# Patient Record
Sex: FEMALE | Race: WHITE | NOT HISPANIC OR LATINO | URBAN - METROPOLITAN AREA
[De-identification: names, ages, dates, MRNs, and addresses within clinical notes are randomized per-mention and may not be internally consistent; named-entity substitution may affect disease eponyms.]

---

## 2021-05-04 ENCOUNTER — COMMUNICATION - HEALTHEAST (OUTPATIENT)
Dept: TELEHEALTH | Facility: CLINIC | Age: 26
End: 2021-05-04

## 2021-05-04 ENCOUNTER — HOSPITAL ENCOUNTER (OUTPATIENT)
Dept: PHYSICAL MEDICINE AND REHAB | Facility: CLINIC | Age: 26
Discharge: HOME | End: 2021-05-04
Attending: PAIN MEDICINE

## 2021-05-04 DIAGNOSIS — M54.2 CERVICALGIA: ICD-10-CM

## 2021-05-04 DIAGNOSIS — G89.29 CHRONIC BILATERAL LOW BACK PAIN WITHOUT SCIATICA: ICD-10-CM

## 2021-05-04 DIAGNOSIS — M54.50 CHRONIC BILATERAL LOW BACK PAIN WITHOUT SCIATICA: ICD-10-CM

## 2021-05-04 DIAGNOSIS — M79.601 PAIN OF RIGHT UPPER EXTREMITY: ICD-10-CM

## 2021-05-04 DIAGNOSIS — M79.18 MYOFASCIAL PAIN: ICD-10-CM

## 2021-05-04 RX ORDER — GABAPENTIN 300 MG/1
300 CAPSULE ORAL 3 TIMES DAILY
Qty: 270 CAPSULE | Refills: 1 | Status: SHIPPED | OUTPATIENT
Start: 2021-05-04

## 2021-05-04 ASSESSMENT — MIFFLIN-ST. JEOR: SCORE: 1352.78

## 2021-05-12 ENCOUNTER — HOSPITAL ENCOUNTER (OUTPATIENT)
Dept: MRI IMAGING | Facility: CLINIC | Age: 26
Discharge: HOME | End: 2021-05-12
Attending: PAIN MEDICINE

## 2021-05-12 DIAGNOSIS — M79.601 PAIN OF RIGHT UPPER EXTREMITY: ICD-10-CM

## 2021-05-12 DIAGNOSIS — M54.2 CERVICALGIA: ICD-10-CM

## 2021-05-14 ENCOUNTER — HOSPITAL ENCOUNTER (OUTPATIENT)
Dept: PHYSICAL MEDICINE AND REHAB | Facility: CLINIC | Age: 26
Discharge: HOME | End: 2021-05-14
Attending: PAIN MEDICINE

## 2021-05-14 DIAGNOSIS — G93.5 CHIARI MALFORMATION TYPE I (H): ICD-10-CM

## 2021-05-14 DIAGNOSIS — M50.20 CERVICAL DISC HERNIATION: ICD-10-CM

## 2021-05-14 DIAGNOSIS — M79.18 MYOFASCIAL PAIN: ICD-10-CM

## 2021-05-14 DIAGNOSIS — M54.50 CHRONIC BILATERAL LOW BACK PAIN WITHOUT SCIATICA: ICD-10-CM

## 2021-05-14 DIAGNOSIS — G89.29 CHRONIC BILATERAL LOW BACK PAIN WITHOUT SCIATICA: ICD-10-CM

## 2021-05-14 DIAGNOSIS — M54.12 CERVICAL RADICULITIS: ICD-10-CM

## 2021-05-14 ASSESSMENT — MIFFLIN-ST. JEOR: SCORE: 1352.78

## 2021-05-18 ENCOUNTER — OFFICE VISIT - HEALTHEAST (OUTPATIENT)
Dept: PHYSICAL THERAPY | Facility: REHABILITATION | Age: 26
End: 2021-05-18

## 2021-05-18 DIAGNOSIS — M54.50 LUMBAR SPINE PAINFUL ON MOVEMENT: ICD-10-CM

## 2021-05-18 DIAGNOSIS — M54.2 CERVICALGIA: ICD-10-CM

## 2021-05-18 DIAGNOSIS — M54.12 CERVICAL RADICULOPATHY: ICD-10-CM

## 2021-05-18 DIAGNOSIS — R19.8 ABDOMINAL WEAKNESS: ICD-10-CM

## 2021-05-21 ENCOUNTER — HOSPITAL ENCOUNTER (OUTPATIENT)
Dept: MRI IMAGING | Facility: CLINIC | Age: 26
Discharge: HOME | End: 2021-05-21
Attending: PAIN MEDICINE

## 2021-05-21 DIAGNOSIS — G93.5 CHIARI MALFORMATION TYPE I (H): ICD-10-CM

## 2021-05-24 ENCOUNTER — COMMUNICATION - HEALTHEAST (OUTPATIENT)
Dept: PHYSICAL MEDICINE AND REHAB | Facility: CLINIC | Age: 26
End: 2021-05-24

## 2021-06-01 ENCOUNTER — OFFICE VISIT - HEALTHEAST (OUTPATIENT)
Dept: PHYSICAL THERAPY | Facility: REHABILITATION | Age: 26
End: 2021-06-01

## 2021-06-01 DIAGNOSIS — M54.50 LUMBAR SPINE PAINFUL ON MOVEMENT: ICD-10-CM

## 2021-06-01 DIAGNOSIS — R19.8 ABDOMINAL WEAKNESS: ICD-10-CM

## 2021-06-01 DIAGNOSIS — M54.2 CERVICALGIA: ICD-10-CM

## 2021-06-01 DIAGNOSIS — M54.12 CERVICAL RADICULOPATHY: ICD-10-CM

## 2021-06-15 ENCOUNTER — HOSPITAL ENCOUNTER (OUTPATIENT)
Dept: PHYSICAL MEDICINE AND REHAB | Facility: CLINIC | Age: 26
Discharge: HOME | End: 2021-06-15
Attending: PAIN MEDICINE

## 2021-06-15 DIAGNOSIS — M50.20 CERVICAL DISC HERNIATION: ICD-10-CM

## 2021-06-15 DIAGNOSIS — G93.5 CHIARI MALFORMATION TYPE I (H): ICD-10-CM

## 2021-06-15 DIAGNOSIS — G89.29 CHRONIC BILATERAL LOW BACK PAIN WITHOUT SCIATICA: ICD-10-CM

## 2021-06-15 DIAGNOSIS — M54.12 CERVICAL RADICULITIS: ICD-10-CM

## 2021-06-15 DIAGNOSIS — M54.50 CHRONIC BILATERAL LOW BACK PAIN WITHOUT SCIATICA: ICD-10-CM

## 2021-06-15 DIAGNOSIS — M79.18 MYOFASCIAL PAIN: ICD-10-CM

## 2021-06-15 RX ORDER — NORGESTIMATE AND ETHINYL ESTRADIOL 0.25-0.035
KIT ORAL
Status: SHIPPED | COMMUNITY
Start: 2021-05-04

## 2021-06-15 ASSESSMENT — MIFFLIN-ST. JEOR: SCORE: 1339.17

## 2021-06-16 ENCOUNTER — OFFICE VISIT - HEALTHEAST (OUTPATIENT)
Dept: PHYSICAL THERAPY | Facility: REHABILITATION | Age: 26
End: 2021-06-16

## 2021-06-16 DIAGNOSIS — R19.8 ABDOMINAL WEAKNESS: ICD-10-CM

## 2021-06-16 DIAGNOSIS — M54.12 CERVICAL RADICULOPATHY: ICD-10-CM

## 2021-06-16 DIAGNOSIS — M54.2 CERVICALGIA: ICD-10-CM

## 2021-06-16 DIAGNOSIS — M54.50 LUMBAR SPINE PAINFUL ON MOVEMENT: ICD-10-CM

## 2021-07-15 VITALS
HEIGHT: 61 IN | WEIGHT: 150 LBS | HEIGHT: 61 IN | BODY MASS INDEX: 28.32 KG/M2 | BODY MASS INDEX: 28.32 KG/M2 | WEIGHT: 150 LBS

## 2021-07-15 VITALS — WEIGHT: 147 LBS | HEIGHT: 61 IN | BODY MASS INDEX: 27.75 KG/M2

## 2021-07-15 NOTE — PROGRESS NOTES
[facility]   Cervical Thoracic Initial Evaluation    Patient Name: Yakelin Rogers  Date of evaluation: 5/18/2021  Referral Diagnosis: Cervicalgia  Referring provider: Sal Vaughan DO  Visit Diagnosis:     ICD-10-CM    1. Cervicalgia  M54.2    2. Cervical radiculopathy  M54.12    3. Lumbar spine painful on movement  M54.5    4. Abdominal weakness  R19.8        Precautions from MRI: Chiari I malformation with mildly beaked inferior cerebellar tonsils extending 7 mm below the foramen magnum.    Assessment:        Patient is a 26 y.o. female that presents with signs and symptoms consistent with RUE and neck pain secondary to cervical radiculopathy and facet arthropathy - patient also complaining of lower back pains too. Patient demonstrates impairments including decreased cervical flexibility and joint mobility, with myofascial restrictions of UT, levators, pectorals and cervical paraspinals, with - ULNTT but reported ulnar radicular symptoms, leading to impaired functional mobility. Patient's functional limitations include sleeping comfortably at night, standing at work for logner than 15 minutes, and performing daily household chores without difficulty. Today patient responded well to patient education, therapeutic exercise and manual therapy.    Patient educated, demonstrated understanding and is in agreement with nature of impairment, plan of care, patient role and HEP. Patient compliant with PT and prognosis is good. Patient would benefit from skilled PT to progress and improve above impairments.    The POC is dynamic and will be modified on an ongoing basis.  Patient will return to clinic if symptoms persist.  Barriers to achieving goals as noted in the assessment section may affect outcome.  Prognosis to achieve goals is  good   Pt. is appropriate for skilled PT intervention as outlined in the Plan of Care (POC).  Pt. is a good candidate for skilled PT services to improve pain levels  and function.    Goals:  Pt. will be independent with home exercise program in : 6 weeks    Pt will: be able to stand for longer than 15 minutes without increased LBP or difficultyby 6 weeks.  Pt will: be able to sleep comfortably throughout the night without increased pain symptoms by 6 weeks.  Pt will: be able to look down without increased radicular symptoms or difficulty by 6 weeks.      Patient's expectations/goals are realistic.    Barriers to Learning or Achieving Goals:  Non- adherence to the home exercise program.  Chronicity of the problem.       Plan / Patient Instructions:        Plan of Care:   Communication with: Referral Source  Patient Related Instruction: Nature of Condition;Treatment plan and rationale;Self Care instruction;Basis of treatment;Body mechanics;Posture;Next steps;Expected outcome  Times per Week: 1  Number of Weeks: 12  Number of Visits: 12  Discharge Planning: independent HEP and/or plateau of progress  Therapeutic Exercise: ROM;Stretching;Strengthening  Neuromuscular Reeducation: kinesio tape;TNE;posture;core  Manual Therapy: soft tissue mobilization;myofascial release;joint mobilization;muscle energy  Modalities: TENS      POC and pathology of condition were reviewed with patient.  Pt. is in agreement with the Plan of Care  A Home Exercise Program (HEP) was initiated today.  Pt. was instructed in exercises by PT and patient was given a handout with detailed instructions.    Plan for next visit: review HEP, cervical isometrics, scapular strengthening, continue with manual tx, lumbar and gluteal strengthening     Subjective:         History of Present Illness:    Yakelin is a 26 y.o. female who presents to therapy today with complaints of RUE and neck pain. Date of onset is April 2021 and onset was sudden, woke up crying in pain, feeling it into her shoulder blade and down the R arm. She did go to chiropractor and massage and none of that has helped, the nerve meds have been helping,  "less intense. Symptoms are constant and getting better. Patient reports her lower back and hips are \"tilted forward\" so she thinks she might have a pinched nerve there too. She denies history of similar symptoms. She describes their previous level of function as not limited. Patient stands 8-9 hours for working, moving constantly, and drives 1 hour everyday and sometimes it is tough for her to get out of the car. The neck is more stiff in the morning, and back is more stiff at night. Sleeping at night patient usually sleeps on her back but has to turn her hips to the L, 2 pillows. Headaches, she can feel the pressure behind her head and eyes.     Pain Rating:3  Pain rating at best: 2  Pain rating at worst: 9  Pain description: tingling and tightness into her shoulders and neck, \"pressure,\" low back is more the sharp shooting pain    Functional limitations are described as occurring with:   turning head  performing routine daily activities  sitting for longer periods of time  standing for longer periods of time with her low back  walking for longer periods of time with her low back    Patient reports benefit from:  medications         Objective:      Note: Items left blank indicates the item was not performed or not indicated at the time of the evaluation.    Patient Outcome Measures :    Neck Disability Score in %: 28     Scores range from 0-100%, where a score of 0% represents minimal pain and maximal function. The minmal clinically important difference is a score reduction of 10%.    Cervical Thoracic Examination  1. Cervicalgia     2. Cervical radiculopathy     3. Lumbar spine painful on movement     4. Abdominal weakness       Involved side: Right  Posture Observation:      General sitting posture is  normal.  General standing posture is normal.    Cervical ROM:  NT today but no visual limitations    Strength   WFL no increase of symptoms - both UE and BLE    Flexibility/Tissue Extensibility: decreased of R UT, " levators, pectorals, cervical paraspinals  Palpation: TTP at R>L cervical spine  Passive Mobility-Joint Integrity: Hypomobile.      Treatment Today      Patient Instruction:  EDU on seated posture, activity modification and exercise consistency in order to see progress/change    Manual Therapy:  Supine MFR R>L UT, levators, cervical paraspinals    Exercises:  Exercise #1: supine or seated chin tuck - hold 5 sec x 10  Comment #1: UT and levator stretching - hold 20-30 sec  Exercise #2: median nerve slides and rockers - 10-20 reps  Comment #2: ulnar nerve glides - 10-20 reps  Exercise #3: supine hip rolls - 20 reps  Comment #3: STKC, piriformis and QL stretch - hold 20-30 sec  Exercise #4: seated piriformis and hamstring stretch - hold 30 sec        TREATMENT MINUTES COMMENTS   Evaluation 15    Self-care/ Home management     Manual therapy 15 Supine MFR R>L UT, levators, cervical paraspinals   Neuromuscular Re-education     Therapeutic Activity     Therapeutic Exercises 25 See flowsheet   Gait training     Modality__________________                Total 55    Blank areas are intentional and mean the treatment did not include these items.     PT Evaluation Code: (Please list factors)  Patient History/Comorbidities: cervicalgia, low back pain  Examination: decreased mobility increased pain  Clinical Presentation: stable  Clinical Decision Making: low    Patient History/  Comorbidities Examination  (body structures and functions, activity limitations, and/or participation restrictions) Clinical Presentation Clinical Decision Making (Complexity)   No documented Comorbidities or personal factors 1-2 Elements Stable and/or uncomplicated Low   1-2 documented comorbidities or personal factor 3 Elements Evolving clinical presentation with changing characteristics Moderate   3-4 documented comorbidities or personal factors 4 or more Unstable and unpredictable High                Heavenly Gold, PT  5/18/2021  8:03  AM

## 2021-07-15 NOTE — PROGRESS NOTES
Progress Notes by Heavenly Gold PT at 6/16/2021  8:00 AM     Author: Heavenly Gold PT Service: -- Author Type: Physical Therapist    Filed: 8/10/2021  9:03 AM Encounter Date: 6/16/2021 Status: Addendum    : Heavenly Gold PT (Physical Therapist)    Related Notes: Original Note by Heavenly Gold PT (Physical Therapist) filed at 6/16/2021  8:27 AM       [facility] Discharge Summary  Patient Name: Yakelin Rogers  Date: 8/10/2021  Referral Diagnosis: neck  Referring provider: No ref. provider found  Visit Diagnosis:   1. Cervicalgia     2. Cervical radiculopathy     3. Lumbar spine painful on movement     4. Abdominal weakness         Goals:  Pt. will be independent with home exercise program in : 6 weeks    Pt will: be able to stand for longer than 15 minutes without increased LBP or difficultyby 6 weeks.  Pt will: be able to sleep comfortably throughout the night without increased pain symptoms by 6 weeks.  Pt will: be able to look down without increased radicular symptoms or difficulty by 6 weeks.      Patient was seen for 3 visits for physical therapy of neck pain from 5/18/21 to 6/16/21 with no follow up appointments.   The patient met goals and has demonstrated understanding of and independence in the home program for self-care, and progression to next steps.  She will initiate contact if questions or concerns arise.  The patient reports feeling better and did not wish to schedule further therapy at this time.    Therapy will be discontinued at this time.  The patient will need a new referral to resume physical therapy treatment. Please see below for patient's current status.    Thank you for your referral.  Heavenly Gold, PT, DPT  8/10/2021   9:03 AM      [facility] Daily Progress     Patient Name: Yakelin Rogers  Date: 6/16/2021  Visit #: 3/12  Referral Diagnosis: RUE and neck pain  Referring provider: No ref. provider found  Visit Diagnosis:      ICD-10-CM    1. Cervicalgia  M54.2    2. Cervical radiculopathy  M54.12    3. Lumbar spine painful on movement  M54.5    4. Abdominal weakness  R19.8        Assessment:     Today patient hasn't been seen in 2 weeks and reports she is doing better! Low back still bothering her the most. Patient has met most goals and would be appropriate to trial independence if she feels comfortable with that    From Eval:  Patient is a 26 y.o. female that presents with signs and symptoms consistent with RUE and neck pain secondary to cervical radiculopathy and facet arthropathy - patient also complaining of lower back pains too. Patient demonstrates impairments including decreased cervical flexibility and joint mobility, with myofascial restrictions of UT, levators, pectorals and cervical paraspinals, with - ULNTT but reported ulnar radicular symptoms, leading to impaired functional mobility. Patient's functional limitations include sleeping comfortably at night, standing at work for logner than 15 minutes, and performing daily household chores without difficulty.     HEP/POC compliance is  good .  Patient demonstrates understanding/independence with home program.  Patient is appropriate to continue with skilled physical therapy intervention, as indicated by initial plan of care.    Goal Status:  Pt. will be independent with home exercise program in : 6 weeks    Pt will: be able to stand for longer than 15 minutes without increased LBP or difficultyby 6 weeks.  Pt will: be able to sleep comfortably throughout the night without increased pain symptoms by 6 weeks.  Pt will: be able to look down without increased radicular symptoms or difficulty by 6 weeks.        Plan / Patient Education:     Continue with initial plan of care.  Progress with home program as tolerated.    Plan for next visit: review HEP, scapular strengthening with bands, continue with manual tx, lumbar and gluteal strengthening - quadruped, abdominal  "strengthening    Subjective:     Patient feeling a lot better, she feels like she is getting stronger in hips and shoulder areas. Feeling like exercises are getting easier. Looking down is still bothersome for her once in awhile she feels it. Lower back probably would be worse.        Pain description: tingling and tightness into her shoulders and neck, \"pressure,\" low back is more the sharp shooting pain       Objective:     Flexibility/Tissue Extensibility: decreased of R UT, levators, pectorals, cervical paraspinals    Treatment Today      Exercises:  Exercise #1: supine or seated chin tuck - hold 5 sec x 10  Comment #1: UT and levator stretching - hold 20-30 sec  Exercise #2: median nerve slides and rockers - 10-20 reps  Comment #2: ulnar nerve glides - 10-20 reps  Exercise #3: supine hip rolls - 20 reps  Comment #3: STKC, piriformis and QL stretch - hold 20-30 sec  Exercise #4: seated piriformis and hamstring stretch - hold 30 sec  Comment #4: theraband row and low row - 10-20 reps orange band  Exercise #5: cervical isometric flexion and SB - hold 5 sec x 10 - progressed to sidelying head lift  Comment #5: bridge with hip adduction - 10-30 reps, progress to 10 sec hold  Exercise #6: SLR with PPT - 10-30 reps - progress to seated positioning  Comment #6: sidelying hip abduction and clamshells - 10-30 reps, progress to banded or standing        TREATMENT MINUTES COMMENTS   Evaluation     Self-care/ Home management     Manual therapy     Neuromuscular Re-education     Therapeutic Activity     Therapeutic Exercises 25 See above flowsheet  Nustep 4 min   Gait training     Modality__________________                Total 25    Blank areas are intentional and mean the treatment did not include these items.       Heavenly Gold, PT  6/16/2021         "

## 2021-07-15 NOTE — PATIENT INSTRUCTIONS - HE
Prescribed Gabapentin today, 300 mg tablets, to be titrated up to 3 tablets 3 times a day as tolerated for your nerve pain. Please follow Gabapentin dosing chart below.    Gabapentin 300mg Dosing Chart    DATE  MORNING AFTERNOON BEDTIME    Day 1 0 0 1    Day 2 0 0 1    Day 3 0 0 1    Day 4 1 0 1    Day 5 1 0 1    Day 6 1 0 1    Day 7 1 1 1    Day 8 1 1 1    Day 9 1 1 1    Day 10 1 1 2    Day 11 1 1 2    Day 12 1 1 2    Day 13 2 1 2    Day 14 2 1 2    Day 15 2 1 2    Day 16 2 2 2    Day 17 2 2 2    Day 18 2 2 2    Day 19 2 2 3    Day 20 2 2 3    Day 21 2 2 3    Day 22 3 2 3    Day 23 3 2 3    Day 24 3 2 3    Day 25 3 3 3    Day 26 3 3 3    Day 27 3 3 3     Continue medication, taking 3 capsules three times daily    Please call if you have any questions regarding how to take your medication  Clinic Phone # [phone]        Discussed the importance of core strengthening, ROM, stretching exercises with the patient and how each of these entities is important in decreasing pain.  Explained to the patient that the purpose of physical therapy is to teach the patient a home exercise program.  These exercises need to be performed every day in order to decrease pain and prevent future occurrences of pain.        I ordered an MRI of your neck.  Once I review these images I will have one of our nurses give you a call with results and recommendations.    ~Please call Nurse Navigation line [phone] with any questions or concerns about your treatment plan, if symptoms worsen and you would like to be seen urgently, or if you have problems controlling bladder and bowel function.

## 2021-07-15 NOTE — PROGRESS NOTES
Assessment:     Diagnoses and all orders for this visit:    Cervical disc herniation    Cervical radiculitis    Chronic bilateral low back pain without sciatica    Myofascial pain    Chiari malformation type I (H)       Yakelin Rogers is a 26 y.o. y.o. female with past medical history significant for  anxiety, depression, chronic low back pain who presents today for follow-up regarding neck pain and right upper extremity pain:     -Overall patient has seen improvement with neck and arm pain.  Pain is likely secondary to cervical radiculopathy.  Physical exam is reassuring that she has normal strength in her upper extremities.    Plan:     A shared decision making plan was used.  The patient's values and choices were respected. Prior medical records from our last visit on 5/14/2021 as well as physical therapy notes were reviewed today. The following represents what was discussed and decided upon by the provider and the patient.     -DIAGNOSTIC TESTS: Images were personally reviewed and interpreted.   --MRI of the cervical spine dated 5/12/2021 is personally reviewed images interpreted discussed with patient.  There is a Chiari I malformation with cerebellar tonsils extending 7 mm below the foramen magnum.  There is degenerative changes greatest at C6-7 with mild spinal canal stenosis and moderate right and mild left foraminal stenosis.  There is also mild right foraminal stenosis at C3-4 through C5-6.  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  There is mild facet arthropathy at C6-7.    --MRI of brain dated 5/21/2021 report is reviewed.  It does show mild Chiari malformation type I.  There is no acute infarct or acute intracranial hemorrhage or intracranial mass.    -INTERVENTIONS: No interventions at this time.    -MEDICATIONS: She has been having side effects with gabapentin I recommend that she trial decreasing this to 1 300 mg tablet at bedtime for 3 nights and then off this medication.  Should she find pain  returns she can consider going back, safely.  -  Discussed side effects of medications and proper use. Patient verbalized understanding.    -PHYSICAL THERAPY: Patient's had 2 sessions of physical therapy.  Recommend she continue with this.  Discussed the importance of core strengthening, ROM, stretching exercises with the patient and how each of these entities is important in decreasing pain.  Explained to the patient that the purpose of physical therapy is to teach the patient a home exercise program.  These exercises need to be performed every day in order to decrease pain and prevent future occurrences of pain.        -PATIENT EDUCATION: Discussed pain management in a multimodal fashion including physical therapy, medication management, reasoning for decreasing gabapentin and possibly going back on it.    -FOLLOW UP: As needed  Advised to contact clinic if symptoms worsen or change.    Subjective:     Yakelin Rogers is a 26 y.o. female who presents today for follow-up regarding neck and right upper extremity pain.  Patient reports that her pain has been improving.  She has had 2 sessions of physical therapy and finds this to be helpful.  She had gone up on the gabapentin to higher doses but noted that her periods seem to be irregular when she was doing this and went back down to 300 mg once in the morning and once at bedtime.  This has been helpful for pain, however she notes that since being on gabapentin she is not been able to have an orgasm and wonders if it is because of this medication.  Her pain today is 2/10 is worst is 9/10 as best as 2/10.  Pain is mainly in the neck to discuss her low back pain.  She notes some numbness and tingling in her right shoulder and her last 3 digits in her right hand.  She notes that she feels like she has a lazy hands.  She denies any bowel or bladder changes, fevers, chills, unintentional weight loss.    No myelopathic symptoms.     Evaluation to Date: MRI the cervical  "spine dated 5/12/2021.  MRI brain dated 5/21/2021.     Treatment to Date: Massage.  2 sessions of physical therapy.    There is no problem list on file for this patient.      Current Outpatient Medications on File Prior to Encounter   Medication Sig Dispense Refill     gabapentin (NEURONTIN) 300 MG capsule Take 1 capsule (300 mg total) by mouth 3 (three) times a day. Increase to 3 tablets three times a day as instructed. 270 capsule 1     SPRINTEC, 28, 0.25-35 mg-mcg per tablet TAKE 1 TABLET BY MOUTH ONCE DAILY. MAY TAKE CONTINUOUSLY IF DESIRED, NEW PACK EVERY 3 WEEKS       No current facility-administered medications on file prior to encounter.        Allergies   Allergen Reactions     Amoxicillin-Pot Clavulanate Rash     Not hives       No past medical history on file.     Review of Systems  ROS: Specifically negative for bowel/bladder dysfunction, balance changes, headache, dizziness, foot drop, fevers, chills, appetite changes, nausea/vomiting, unexplained weight loss. Otherwise 13 systems reviewed are negative. Please see the patient's intake questionnaire from today for details.    Reviewed Social, Family, Past Medical and Past Surgical history with patient, no significant changes noted since prior visit.     Objective:     /61 (Patient Site: Right Arm, Patient Position: Sitting)   Pulse 73   Ht 5' 1\" (1.549 m)   Wt 147 lb (66.7 kg)   LMP 05/06/2021   BMI 27.78 kg/m      PHYSICAL EXAMINATION:   --CONSTITUTIONAL: Vital signs as above. No acute distress. The patient is well nourished and well groomed.  --PSYCHIATRIC:  The patient is awake, alert, oriented to person, place, time and answering questions appropriately with clear speech. Appropriate mood and affect   --HEENT: Sclera are non-injected.   --SKIN: Skin over the face is clean, dry, intact without rashes.  --RESPIRATORY: Normal rhythm and effort. No abnormal accessory muscle breathing patterns noted.   --GROSS MOTOR: Easily arises from a seated " position.   --CERVICAL SPINE: Inspection reveals no evidence of deformity. Range of motion is mildly limited in cervical flexion, extension, lateral rotation.  Mild tenderness palpation on the right cervical paraspinal muscles  --UPPER EXTREMITY MOTOR TESTING:  Wrist flexion left 5/5, right 5/5  Wrist extension left 5/5, right 5/5  Pronators left 5/5, right 5/5  Biceps left 5/5, right 5/5   Triceps left 5/5, right 5/5   Shoulder abduction left 5/5, right 5/5   left 5/5, right 5/5  --NEUROLOGIC: Decreased sensation in her right hand.    Imaging of the cervical spine: Cervical spine imaging was reviewed today. The images were shown to the patient and the findings were explained using a spine model.    Mr Brain Without Contrast    Result Date: 5/24/2021  EXAM: MR BRAIN WO CONTRAST LOCATION: [facility] DATE/TIME: 5/21/2021 9:21 PM INDICATION: Chiari malformation COMPARISON: MRI cervical spine with and without contrast 05/12/2021. TECHNIQUE: Routine multiplanar multisequence head MRI without intravenous contrast. FINDINGS: INTRACRANIAL CONTENTS: Cerebellar tonsils extend 7 mm below the level of the foramen magnum. There is mild pointing of the left cerebellar tonsil. There is mild crowding at the level of the foramen magnum. No acute or subacute infarct. No acute intracranial hemorrhage. No midline shift. Normal brain parenchymal signal. Normal ventricles and sulci. Normal position of the cerebellar tonsils. SELLA: No abnormality accounting for technique. OSSEOUS STRUCTURES/SOFT TISSUES: Normal marrow signal. The major intracranial vascular flow voids are maintained. ORBITS: No abnormality accounting for technique. SINUSES/MASTOIDS: No paranasal sinus mucosal disease. No middle ear or mastoid effusion.     1.  Mild Chiari I malformation with 7 mm of tonsillar ectopia mild crowding at the level of the foramen magnum. 2.  No acute infarct, acute intracranial hemorrhage, or intracranial  mass.

## 2021-07-15 NOTE — PROGRESS NOTES
Assessment:     Diagnoses and all orders for this visit:    Chiari malformation type I (H)  -     MR Brain Without Contrast; Future; Expected date: 05/21/2021    Cervical radiculitis    Cervical disc herniation    Chronic bilateral low back pain without sciatica    Myofascial pain       Yakelin Rogers is a 26 y.o. y.o. female with past medical history significant for  anxiety, depression, chronic low back pain who presents today for follow-up regarding neck pain and right upper extremity pain.:     -Patient's pain is likely secondary to cervical radiculopathy.  She does have normal strength on physical exam.  On MRI there it is an incidental Chiari type I malformation.  I spoken with neurosurgery who recommends that I obtain an MRI of the brain to further evaluate for hydrocephalus.  I will order this.  Patient is in agreement.    Plan:     A shared decision making plan was used.  The patient's values and choices were respected. Prior medical records from our last visit on 5/4/2021 were reviewed today. The following represents what was discussed and decided upon by the provider and the patient.     -DIAGNOSTIC TESTS: Images were personally reviewed and interpreted.   --MRI of the cervical spine dated 5/12/2021 is personally reviewed images interpreted discussed with patient.  There is a Chiari I malformation with cerebellar tonsils extending 7 mm below the foramen magnum.  There is degenerative changes greatest at C6-7 with mild spinal canal stenosis and moderate right and mild left foraminal stenosis.  There is also mild right foraminal stenosis at C3-4 through C5-6.  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  There is mild facet arthropathy at C6-7.     -INTERVENTIONS: No interventions at this time.    -MEDICATIONS: She is currently taking gabapentin 300 mg twice daily.  Recommend that she continue with this.  -  Discussed side effects of medications and proper use. Patient verbalized understanding.    -PHYSICAL  THERAPY: She will start physical therapy next week.  Discussed the importance of core strengthening, ROM, stretching exercises with the patient and how each of these entities is important in decreasing pain.  Explained to the patient that the purpose of physical therapy is to teach the patient a home exercise program.  These exercises need to be performed every day in order to decrease pain and prevent future occurrences of pain.        -PATIENT EDUCATION: We discussed pain management in a multimodal fashion including physical therapy, medication management, reasoning for new imaging.    -FOLLOW UP: Patient will follow up as scheduled.  Advised to contact clinic if symptoms worsen or change.    Subjective:     Yakelin Rogers is a 26 y.o. female who presents today for follow-up regarding neck pain and right upper extremity pain as well as low back pain.  Patient reports that she is feeling somewhat better with gabapentin.  She is currently taking  300 mg twice daily.  Her pain is worse with standing, reaching down and bending down with her neck.  It is improved with sitting and laying down.  She notes that she does have occasional headache, however they are not bad and not migraines.  Her pain today is 4/10 is worst is 8/10 is best a 2/10.  She has not had physical therapy yet but plans to start this next week and is scheduled.  She denies any bowel or bladder changes, fevers, chills, unintentional weight loss.    No myelopathic symptoms.     Evaluation to Date: MRI the cervical spine dated 5/12/2021.    Treatment to Date: Massage.    There is no problem list on file for this patient.      Current Outpatient Medications on File Prior to Encounter   Medication Sig Dispense Refill     gabapentin (NEURONTIN) 300 MG capsule Take 1 capsule (300 mg total) by mouth 3 (three) times a day. Increase to 3 tablets three times a day as instructed. 270 capsule 1     No current facility-administered medications on file prior to  "encounter.        Allergies   Allergen Reactions     Amoxicillin-Pot Clavulanate Rash     Not hives       No past medical history on file.     Review of Systems  ROS: Specifically negative for bowel/bladder dysfunction, balance changes, dizziness, foot drop, fevers, chills, appetite changes, nausea/vomiting, unexplained weight loss. Otherwise 13 systems reviewed are negative. Please see the patient's intake questionnaire from today for details.    Reviewed Social, Family, Past Medical and Past Surgical history with patient, no significant changes noted since prior visit.     Objective:     /79 (Patient Site: Right Arm, Patient Position: Sitting, Cuff Size: Adult Regular)   Pulse 73   Temp 98.3  F (36.8  C) (Oral)   Ht 5' 1\" (1.549 m)   Wt 150 lb (68 kg)   BMI 28.34 kg/m      PHYSICAL EXAMINATION:   --CONSTITUTIONAL: Vital signs as above. No acute distress. The patient is well nourished and well groomed.  --PSYCHIATRIC:  The patient is awake, alert, oriented to person, place, time and answering questions appropriately with clear speech. Appropriate mood and affect   --HEENT: Sclera are non-injected.  --SKIN: Skin over the face is clean, dry, intact without rashes.  --RESPIRATORY: Normal rhythm and effort. No abnormal accessory muscle breathing patterns noted.   --GROSS MOTOR: Easily arises from a seated position.   --CERVICAL SPINE: Inspection reveals no evidence of deformity. Range of motion is mildly limited in cervical flexion, extension, lateral rotation.    --UPPER EXTREMITY MOTOR TESTING:  Wrist flexion left 5/5, right 5/5  Wrist extension left 5/5, right 5/5  Pronators left 5/5, right 5/5  Biceps left 5/5, right 5/5   Triceps left 5/5, right 5/5   Shoulder abduction left 5/5, right 5/5   left 5/5, right 5/5  --NEUROLOGIC:  2/4 symmetric biceps, brachioradialis, triceps reflexes bilaterally. Sensation to upper extremities is intact.    Imaging of the cervical spine: Cervical spine imaging was " reviewed today. The images were shown to the patient and the findings were explained using a spine model.    Mr Cervical Spine With Without Contrast    Result Date: 5/13/2021  [facility] MR CERVICAL SPINE W WO CONTRAST 5/12/2021 9:10 PM INDICATION: Neck pain and radicular arm pain TECHNIQUE: Routine multiplanar multisequence MRI of the cervical spine without and with intravenous contrast. CONTRAST: Gadavist 7 mL COMPARISON: None. FINDINGS: There is congenital fusion of the C2 and C3 vertebrae. Otherwise normal vertebral body heights with small Schmorl's nodes along the apposing endplates at C6-C7. There is straightening of the normal cervical lordosis with slight anterolisthesis of C3 on C4 and slight retrolisthesis of C6 on C7. C7 vertebral body hemangioma. No pathologic bone marrow signal abnormality. Normal appearance of the craniocervical junction and C1-C2. No pathologic spinal cord signal abnormality or abnormal enhancement. There is inferior cerebellar tonsillar ectopia, with the cerebellar tonsils extending 7 mm below the foramen magnum and demonstrating mildly beaked morphology. Grossly normal paraspinal soft tissues. The vertebral artery flow voids are preserved. C2-C3: Congenital fusion across the disc space. No herniation. No facet arthropathy. No uncovertebral joint hypertrophy. No spinal canal stenosis. No right neural foraminal stenosis. No left neural foraminal stenosis. C3-C4: Normal disc height. No herniation. Mild left facet arthropathy. Mild right uncovertebral joint hypertrophy. No spinal canal stenosis. Mild right neural foraminal stenosis. No left neural foraminal stenosis. C4-C5: Normal disc height. No herniation. Mild left facet arthropathy. Mild right uncovertebral joint hypertrophy. No spinal canal stenosis. Mild right neural foraminal stenosis. No left neural foraminal stenosis.  C5-C6: Normal disc height. Shallow posterior and annular disc bulge. No facet  arthropathy. Mild right uncovertebral joint hypertrophy. No spinal canal stenosis. Mild right neural foraminal stenosis. No left neural foraminal stenosis. C6-C7: Mild loss of disc height. Small broad-based ventral disc osteophyte complex, eccentric to the right neural foramen. Mild bilateral facet arthropathy. Moderate left uncovertebral joint hypertrophy. Mild spinal canal stenosis. Moderate right neural foraminal stenosis. Mild left neural foraminal stenosis. C7-T1: Normal disc height. No herniation. No facet arthropathy. Mild left uncovertebral joint hypertrophy. No spinal canal stenosis. No right neural foraminal stenosis. No left neural foraminal stenosis.     CONCLUSION: 1.  Chiari I malformation with mildly beaked inferior cerebellar tonsils extending 7 mm below the foramen magnum. 2.  Multilevel degenerative changes of the cervical spine as described in detail above, greatest at C6-C7, where there is mild spinal canal stenosis with moderate right and mild left neuroforaminal stenosis. 3.  Additional mild right neuroforaminal stenosis at the C3-C4 through C5-C6 levels. 4.  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

## 2021-07-15 NOTE — PROGRESS NOTES
[facility] Daily Progress     Patient Name: Yakelin Rogers  Date: 6/1/2021  Visit #: 2/12  Referral Diagnosis: RUE and neck pain  Referring provider: No ref. provider found  Visit Diagnosis:     ICD-10-CM    1. Cervicalgia  M54.2    2. Cervical radiculopathy  M54.12    3. Lumbar spine painful on movement  M54.5    4. Abdominal weakness  R19.8        Assessment:     Today patient hasn't been seen in 2 weeks and reports she is doing okay, maybe a little bit better. Patient tolerated addition of scapular/cervical and lumbar/gluteal strengthening today without increased symptoms.     From Eval:  Patient is a 26 y.o. female that presents with signs and symptoms consistent with RUE and neck pain secondary to cervical radiculopathy and facet arthropathy - patient also complaining of lower back pains too. Patient demonstrates impairments including decreased cervical flexibility and joint mobility, with myofascial restrictions of UT, levators, pectorals and cervical paraspinals, with - ULNTT but reported ulnar radicular symptoms, leading to impaired functional mobility. Patient's functional limitations include sleeping comfortably at night, standing at work for logner than 15 minutes, and performing daily household chores without difficulty.     HEP/POC compliance is  good .  Patient demonstrates understanding/independence with home program.  Patient is appropriate to continue with skilled physical therapy intervention, as indicated by initial plan of care.    Goal Status:  Pt. will be independent with home exercise program in : 6 weeks    Pt will: be able to stand for longer than 15 minutes without increased LBP or difficultyby 6 weeks.  Pt will: be able to sleep comfortably throughout the night without increased pain symptoms by 6 weeks.  Pt will: be able to look down without increased radicular symptoms or difficulty by 6 weeks.        Plan / Patient Education:     Continue with initial plan of  "care.  Progress with home program as tolerated.    Plan for next visit: review HEP, scapular strengthening with bands, continue with manual tx, lumbar and gluteal strengthening - quadruped, abdominal strengthening    Subjective:     Patient reports she is maybe feeling a little better, it isn't a constant pain anymore, but she is feeling it more in the morning and at night, but throughout the day it is much better. Neck and low back pain are back and forth as to which one is worse.     Pain description: tingling and tightness into her shoulders and neck, \"pressure,\" low back is more the sharp shooting pain       Objective:     Flexibility/Tissue Extensibility: decreased of R UT, levators, pectorals, cervical paraspinals    Treatment Today      Exercises:  Exercise #1: supine or seated chin tuck - hold 5 sec x 10  Comment #1: UT and levator stretching - hold 20-30 sec  Exercise #2: median nerve slides and rockers - 10-20 reps  Comment #2: ulnar nerve glides - 10-20 reps  Exercise #3: supine hip rolls - 20 reps  Comment #3: STKC, piriformis and QL stretch - hold 20-30 sec  Exercise #4: seated piriformis and hamstring stretch - hold 30 sec  Comment #4: theraband row and low row - 10-20 reps orange band  Exercise #5: cervical isometric flexion and SB - hold 5 sec x 10  Comment #5: bridge with hip adduction - 10-30 reps  Exercise #6: SLR with PPT - 10-30 reps  Comment #6: sidelying hip abduction and clamshells - 10-30 reps        TREATMENT MINUTES COMMENTS   Evaluation     Self-care/ Home management     Manual therapy     Neuromuscular Re-education     Therapeutic Activity     Therapeutic Exercises 25 See above flowsheet  Nustep 4 min   Gait training     Modality__________________                Total 25    Blank areas are intentional and mean the treatment did not include these items.       Heavenly Gold, PT  6/1/2021      "

## 2021-07-15 NOTE — PATIENT INSTRUCTIONS - HE
I have ordered an MRI of your brain.  Once I reviewed images we will have one of our nurses give you a call to either schedule you to come in or let you know the results over the phone.    Recommend you start physical therapy next week.    I recommend you continue with gabapentin.    ~Please call Nurse Navigation line [phone] with any questions or concerns about your treatment plan, if symptoms worsen and you would like to be seen urgently, or if you have problems controlling bladder and bowel function.

## 2021-07-15 NOTE — PROGRESS NOTES
ASSESSMENT: Yakelin Rogers is a 26 y.o. female who presents as a self-referral, with a past medical history significant for anxiety, depression, chronic low back pain who presents today for new patient evaluation of neck and chronic low back pain:    -Overall patient physical exam is reassuring that she has normal strength and reflexes in her upper extremities.  Her pain is likely secondary to cervical radiculopathy.  For an MRI of her cervical spine to further evaluate.  She does have positive Spurling's and positive relief sign on the right.    Patient is neurologically intact on exam. No myelopathic or red flag symptoms.     CHITO Score: 38  NDI Score: 48    WHO 5: 4     Diagnoses and all orders for this visit:    Cervicalgia  -     MR Cervical Spine With Without Contrast; Future; Expected date: 05/11/2021  -     Ambulatory referral to Physical Therapy    Pain of right upper extremity  -     MR Cervical Spine With Without Contrast; Future; Expected date: 05/11/2021  -     Ambulatory referral to Physical Therapy  -     gabapentin (NEURONTIN) 300 MG capsule; Take 1 capsule (300 mg total) by mouth 3 (three) times a day. Increase to 3 tablets three times a day as instructed.  Dispense: 270 capsule; Refill: 1    Chronic bilateral low back pain without sciatica  -     Ambulatory referral to Physical Therapy    Myofascial pain  -     Ambulatory referral to Physical Therapy      PLAN:  Reviewed spine anatomy and disease process. Discussed diagnosis and treatment options with the patient today. A shared decision making model was used.  The patient's values and choices were respected. The following represents what was discussed and decided upon by the provider and the patient.      -DIAGNOSTIC TESTS:   --I ordered an MRI of her cervical spine.  Once have reviewed images I will nurses give her a call with results and recommendations.    -PHYSICAL THERAPY: Ordered physical therapy for her neck and right arm pain as well as  low back pain.  Like her to have a home-going exercise program that she can do on a daily basis.  Discussed the importance of core strengthening, ROM, stretching exercises with the patient and how each of these entities is important in decreasing pain.  Explained to the patient that the purpose of physical therapy is to teach the patient a home exercise program.  These exercises need to be performed every day in order to decrease pain and prevent future occurrences of pain.        -MEDICATIONS: She is trialed ibuprofen with no relief.  I ordered gabapentin 300 mg and given her chart of how I want her to increase this medication until she is taking 600-900 mg 3 times a day.  -  Discussed multiple medication options today with patient. Discussed risks, side effects, and proper use of medications. Patient verbalized understanding.    -INTERVENTIONS: No interventions at this time.  Discussed risks and benefits of injections with patient today.    -PATIENT EDUCATION: We discussed pain management in a multimodal fashion including physical therapy, medication management, reasoning for advanced imaging.    -FOLLOW-UP:   Patient will follow up in 4 weeks in person.    Advised patient to call [facility] if symptoms worsen or you have problems controlling bladder and bowel function.   ______________________________________________________________________    SUBJECTIVE:  HPI:  Yakelin Rogers  Is a 26 y.o. female who presents today for new patient evaluation of new neck and right upper extremity pain as well as chronic low back pain.  Patient reports that a month ago she woke up with pain in her neck and right arm.  Pain is continued.  She has had a massage which was somewhat helpful with temporary relief.  Pain goes from the right occiput down to the right cervical spine into the right shoulder blade as well as the right elbow posterior forearm with numbness and tingling in her fourth and fifth digits on the right hand.   She also has a history of chronic low back pain that is been going on for many years.  She like to focus on the neck and right arm at this time and maybe come back to the low back pain in the future.  She is trialed ibuprofen with no relief.  Pain is worse with bending her neck down with things like tying her shoes.  Pain is also worsened with prolonged sitting.  If she lays down she can get some relief.  She also notes that she brings her right arm over her head she has relief as well.  This pain will wake her at night.  She is never had pain like this before.  There was no injury or inciting event that she can think of.  Currently she is not taking any medications for pain.  She is not had physical therapy.  In the past she is not had any surgeries.  She denies any bowel or bladder changes, fevers, chills, unintentional weight loss.    -Treatment to Date: Massage    -Medications:    No current outpatient medications on file prior to encounter.     No current facility-administered medications on file prior to encounter.        Allergies   Allergen Reactions     Amoxicillin-Pot Clavulanate Rash     Not hives     Past medical history: Depression, anxiety, chronic low back pain    Problems: Depression, anxiety, chronic low back pain, acute cervical pain and right upper extremity pain.    Surgical history: None    Family history: Both of her  grandfathers had cancer.  Her grandmother had a stroke.  Reviewed past medical, surgical, and family history with patient found on new patient intake packet located in [employer]MR [employer] tab.     SOCIAL HX: She denies smoking.  She drinks alcohol occasionally.  She used to be a heavy drinker.  She denies use of recreational drugs.  She works in the [employer] at [employer] in [place]    ROS:  Specifically negative for bowel/bladder dysfunction, balance changes, headache, dizziness, foot drop, fevers, chills, appetite changes, nausea/vomiting, unexplained weight loss. Otherwise 13 systems reviewed  "are negative. Please see the patient's intake questionnaire from today for details.    OBJECTIVE:  /90 (Patient Site: Right Arm, Patient Position: Sitting, Cuff Size: Adult Regular)   Pulse 71   Temp 98.5  F (36.9  C) (Oral)   Ht 5' 1\" (1.549 m)   Wt 150 lb (68 kg)   BMI 28.34 kg/m      PHYSICAL EXAMINATION:    --CONSTITUTIONAL:  Vital signs as above.  No acute distress.  The patient is well nourished and well groomed.  --PSYCHIATRIC:  Appropriate mood and affect. The patient is awake, alert, oriented to person, place, time and answering questions appropriately with clear speech.    --SKIN:  Skin over the face, bilateral lower extremities, and posterior torso is clean, dry, intact without  --HEENT:  Sclera are non-injected.    --SKIN:  Skin over the face, bilateral upper extremities is clean, dry, intact without rashes.  --UPPER EXTREMITY MOTOR TESTING:  Wrist flexion left 5/5, right 5/5  Wrist extension left 5/5, right 5/5  Pronators left 5/5, right 5/5  Biceps left 5/5, right 5/5   Triceps left 5/5, right 5/5   Shoulder abduction left 5/5, right 5/5   left 5/5, right 5/5  --NEUROLOGIC:   2/4 symmetric biceps, brachioradialis, triceps reflexes bilaterally.  Sensation to upper extremities is intact.  Negative Randolph's bilaterally.  Spurling's maneuver is positive on the right.  --CERVICAL SPINE: Inspection reveals no evidence of deformity. Range of motion is not limited in cervical flexion, extension, or lateral rotation.  Tenderness palpation along the right cervical paraspinal muscles, trapezial muscle and intrascapular muscles.  --SHOULDERS: Full range of motion bilaterally. Negative empty can.           "

## 2021-07-15 NOTE — TELEPHONE ENCOUNTER
----- Message from Sal Vaughan, DO sent at 5/24/2021  2:16 PM CDT -----  Please call the patient let her know that there are no abnormal findings on her brain MRI other than the finding we found on the cervical MRI of the Chiari malformation.  Recommend that she follow-up as scheduled and have her continue with physical therapy.  No new recommendations to see neurosurgery at this time.

## 2021-07-15 NOTE — PATIENT INSTRUCTIONS - HE
Recommend you take gabapentin 300 mg just at bedtime for 3 nights.  After this point you can stop this medication.  Should pain return you can go back on it safely.    I recommend that you continue with your physical therapy sessions.    ~Please call Nurse Navigation line [phone] with any questions or concerns about your treatment plan, if symptoms worsen and you would like to be seen urgently, or if you have problems controlling bladder and bowel function.